# Patient Record
Sex: FEMALE | Race: WHITE | Employment: STUDENT | URBAN - METROPOLITAN AREA
[De-identification: names, ages, dates, MRNs, and addresses within clinical notes are randomized per-mention and may not be internally consistent; named-entity substitution may affect disease eponyms.]

---
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
## 2025-05-21 ENCOUNTER — TELEPHONE (OUTPATIENT)
Dept: OBGYN CLINIC | Facility: CLINIC | Age: 20
End: 2025-05-21

## 2025-05-21 DIAGNOSIS — Z30.9 ENCOUNTER FOR CONTRACEPTIVE MANAGEMENT, UNSPECIFIED TYPE: ICD-10-CM

## 2025-05-21 RX ORDER — NORETHINDRONE ACETATE AND ETHINYL ESTRADIOL .03; 1.5 MG/1; MG/1
1 TABLET ORAL DAILY
Qty: 84 TABLET | Refills: 3 | Status: SHIPPED | OUTPATIENT
Start: 2025-05-21

## 2025-05-21 NOTE — TELEPHONE ENCOUNTER
Lmtcb- this RN will refill her birth control since she has an appointment on 5/27 for an annual exam.

## 2025-05-27 ENCOUNTER — OFFICE VISIT (OUTPATIENT)
Dept: OBGYN CLINIC | Facility: CLINIC | Age: 20
End: 2025-05-27
Payer: COMMERCIAL

## 2025-05-27 VITALS
DIASTOLIC BLOOD PRESSURE: 79 MMHG | HEIGHT: 62 IN | HEART RATE: 82 BPM | BODY MASS INDEX: 25.03 KG/M2 | WEIGHT: 136 LBS | SYSTOLIC BLOOD PRESSURE: 112 MMHG

## 2025-05-27 DIAGNOSIS — Z11.3 SCREEN FOR STD (SEXUALLY TRANSMITTED DISEASE): ICD-10-CM

## 2025-05-27 DIAGNOSIS — Z30.9 ENCOUNTER FOR CONTRACEPTIVE MANAGEMENT, UNSPECIFIED TYPE: ICD-10-CM

## 2025-05-27 DIAGNOSIS — Z01.419 WOMEN'S ANNUAL ROUTINE GYNECOLOGICAL EXAMINATION: Primary | ICD-10-CM

## 2025-05-27 PROCEDURE — 3008F BODY MASS INDEX DOCD: CPT | Performed by: ADVANCED PRACTICE MIDWIFE

## 2025-05-27 PROCEDURE — 3074F SYST BP LT 130 MM HG: CPT | Performed by: ADVANCED PRACTICE MIDWIFE

## 2025-05-27 PROCEDURE — 99395 PREV VISIT EST AGE 18-39: CPT | Performed by: ADVANCED PRACTICE MIDWIFE

## 2025-05-27 PROCEDURE — 3078F DIAST BP <80 MM HG: CPT | Performed by: ADVANCED PRACTICE MIDWIFE

## 2025-05-27 RX ORDER — NORETHINDRONE ACETATE AND ETHINYL ESTRADIOL .03; 1.5 MG/1; MG/1
1 TABLET ORAL DAILY
Qty: 84 TABLET | Refills: 4 | Status: SHIPPED | OUTPATIENT
Start: 2025-05-27

## 2025-05-27 NOTE — PROGRESS NOTES
Keila Carrizales is a 20 year old female.    HPI:       Keila Carrizales is a 20 year old female.   No LMP recorded. (Menstrual status: Continuous Pill).    Chief Complaint   Patient presents with    Annual     Annual; last annual 24 needs a refill on birth control.        History of Present Illness  Keila Carrizales is a 20 year old female who presents for a routine gynecological follow-up and birth control management.    She is currently taking continuous birth control pills, which she finds effective without any issues. However, she has been unable to obtain her prescription for the past week due to insurance issues, delaying her refill until .    She is sexually active with a female partner of three months, and she is monogamous. She has no concerns about discharge or other gynecological symptoms. She has completed her Gardasil vaccine series and feels safe in her relationship with no concerns for abuse.    Her family history includes an uncle with esophageal cancer attributed to smoking and a great aunt who had cancer but is now in remission. There is no family history of breast, ovarian, or endometrial cancer.    She does not smoke and has no history of smoking. She mentions having a bit of a cold, possibly related to allergies.            Period Cycle (Days): No period with birth control pills  Use of Birth Control (if yes, specify type): OCP    Tobacco Use: Low Risk  (2025)    Patient History     Smoking Tobacco Use: Never     Smokeless Tobacco Use: Never     Passive Exposure: Never        Gardail- completed    Declines chaperone    Note: This is a gyn only visit. Pt  is referred back to PCP for care of any non-gyn concerns listed above and  in the Problem List.      PHQ-2 not done in last 12 months! Please administer and refresh!  Last [place] Suicide Screening on 2025 was No Risk.       Depression Screening (PHQ-2/PHQ-9): Over the LAST 2 WEEKS   Little  interest or pleasure in doing things (over the last two weeks)?: Not at all    Feeling down, depressed, or hopeless (over the last two weeks)?: Not at all    PHQ-2 SCORE: 0           HISTORY:  Past Medical History[1]   Past Surgical History[2]   Family History[3]   Social History: Short Social Hx on File[4]     OB History    Para Term  AB Living   0 0 0 0 0 0   SAB IAB Ectopic Multiple Live Births   0 0 0 0 0       Medications (Active prior to today's visit):  Current Medications[5]    Allergies:  Allergies[6]      ROS:     Review of Systems   Constitutional: Negative.    Respiratory: Negative.     Cardiovascular: Negative.    Gastrointestinal: Negative.    Genitourinary: Negative.    Neurological: Negative.    Psychiatric/Behavioral: Negative.           PHYSICAL EXAM:     Vitals:    25 1350   BP: 112/79   Pulse: 82     Physical Exam  Constitutional:       General: She is not in acute distress.     Appearance: Normal appearance. She is not ill-appearing.   Genitourinary:      Urethral meatus normal.      No lesions in the vagina.      Right Labia: No rash, tenderness, lesions, skin changes or Bartholin's cyst.     Left Labia: No tenderness, lesions, skin changes, Bartholin's cyst or rash.     No vaginal discharge, erythema, tenderness, bleeding or ulceration.      No cervical motion tenderness, discharge, friability or lesion.      No urethral prolapse or discharge present.   HENT:      Head: Normocephalic.   Pulmonary:      Effort: Pulmonary effort is normal.   Neurological:      Mental Status: She is alert and oriented to person, place, and time.   Skin:     General: Skin is warm and dry.   Psychiatric:         Mood and Affect: Mood normal.         Behavior: Behavior normal.         Thought Content: Thought content normal.          ASSESSMENT/PLAN:          Diagnoses and all orders for this visit:    Women's annual routine gynecological examination    Encounter for contraceptive management,  unspecified type  -     Norethindrone Acet-Ethinyl Est (JUNEL 1.5/30) 1.5-30 MG-MCG Oral Tab; Take 1 tablet by mouth daily. Skip placebo pills and start next pack    Screen for STD (sexually transmitted disease)  -     Chlamydia/Gc Amplification; Future        Assessment & Plan  Contraceptive management  She is on continuous Norethindrone acetate-ethinyl estradiol without issues. A recent insurance issue delayed her prescription refill until June 1st, resulting in a week without medication. The insurance may assume she uses placebo pills, which she does not.  - Refill Norethindrone acetate-ethinyl estradiol for three months.  - Discontinue duplicate prescription.  - Prescribe to skip placebo pills and start next pack to resolve insurance issue.    Screening for sexually transmitted infections  She is sexually active with a female partner in a monogamous relationship for three months. She reports no symptoms of discharge or other concerns. Screening for gonorrhea and chlamydia is recommended as a precaution.  - Obtain urine sample for gonorrhea and chlamydia testing.    General Health Maintenance  She has completed the Gardasil vaccine series, does not smoke, and has no significant family history of breast, ovarian, or endometrial cancer. General health maintenance measures including vitamin supplementation and sun protection are recommended.  - Recommend multivitamin and vitamin D supplementation.  - Advise daily sunblock use to prevent sun damage.                The following individual(s) verbally consented to be recorded using ambient AI listening technology and understand that they can each withdraw their consent to this listening technology at any point by asking the clinician to turn off or pause the recording:    Patient name: Keila Carrizales         5/27/2025  Liana Rousseau CNM         [1]   Past Medical History:   Allergy to peanuts    Epipen, Avoidance   [2] History reviewed. No pertinent  surgical history.  [3]   Family History  Problem Relation Age of Onset    No Known Problems Mother     Other (Other) Father         anxiety, ADD    Other (Other) Sister         Ulcerative Colitis    Diabetes Other         MGGF    High Cholesterol Maternal Grandmother     Arthritis Maternal Grandmother         rheumatoid    Other (Other) Maternal Grandmother         Crohn's    High Cholesterol Paternal Grandfather     Other (Other) Paternal Grandfather         gout    No Known Problems Maternal Grandfather     Other (Other) Paternal Grandmother         anxiety   [4]   Social History  Socioeconomic History    Marital status: Single   Tobacco Use    Smoking status: Never     Passive exposure: Never    Smokeless tobacco: Never   Vaping Use    Vaping status: Never Used   Substance and Sexual Activity    Alcohol use: Yes     Comment: occ    Drug use: Never    Sexual activity: Yes     Partners: Female     Birth control/protection: OCP   Other Topics Concern    Reaction to local anesthetic No    Pt has a pacemaker No    Pt has a defibrillator No   [5]   Current Outpatient Medications   Medication Sig Dispense Refill    Norethindrone Acet-Ethinyl Est (JUNEL 1.5/30) 1.5-30 MG-MCG Oral Tab Take 1 tablet by mouth daily. Skip placebo pills and start next pack 84 tablet 4    EPINEPHrine (EPIPEN 2-SUKHDEV) 0.3 MG/0.3ML Injection Solution Auto-injector Inject 0.3 ml once IM for anaphylaxis, then call 911. 4 each 2    benzonatate 200 MG Oral Cap Take 1 capsule (200 mg total) by mouth 3 (three) times daily as needed for cough. 30 capsule 0    VYVANSE 40 MG Oral Cap Take 1 capsule (40 mg total) by mouth every morning. 30 capsule 0    ARIpiprazole 5 MG Oral Tab       buPROPion HCl ER, XL, 300 MG Oral Tablet 24 Hr TAKE ONE TABLET BY MOUTH EVERY DAY IN THE MORNING AS DIRECTED      Cholecalciferol (VITAMIN D) 2000 units Oral Cap Take 2,000 capsules (4,000,000 Units total) by mouth daily.      CHILDRENS MULTIVITAMIN OR 2 chewables daily       fluticasone propionate 50 MCG/ACT Nasal Suspension 2 sprays by Each Nare route daily. (Patient not taking: Reported on 5/27/2025) 1 each 0    levocetirizine 5 MG Oral Tab Take 1 tablet (5 mg total) by mouth every evening. (Patient not taking: Reported on 5/27/2025) 30 tablet 0   [6]   Allergies  Allergen Reactions    Lentils HIVES    Cashews     Peanut Oil     Peanut Oil (Peanut Butter) HIVES    Peanuts HIVES    Peas     Seasonal

## 2025-05-28 LAB
C TRACH DNA SPEC QL NAA+PROBE: NEGATIVE
N GONORRHOEA DNA SPEC QL NAA+PROBE: NEGATIVE

## (undated) NOTE — LETTER
11/4/2022                      To Whom it may concern: This is to certify that Pattie Flaherty had an appointment on 11/4/2022 at 8:33 AM with Kavon Monroe PA-C.         Sincerely,    Kavon Monroe PA-C  [address] [address] [phone]        Document electronically generated by:  Kavon Monroe PA-C